# Patient Record
Sex: FEMALE | Race: OTHER | HISPANIC OR LATINO
[De-identification: names, ages, dates, MRNs, and addresses within clinical notes are randomized per-mention and may not be internally consistent; named-entity substitution may affect disease eponyms.]

---

## 2017-04-18 ENCOUNTER — APPOINTMENT (OUTPATIENT)
Dept: INTERNAL MEDICINE | Facility: CLINIC | Age: 57
End: 2017-04-18

## 2017-05-11 ENCOUNTER — APPOINTMENT (OUTPATIENT)
Dept: INTERNAL MEDICINE | Facility: CLINIC | Age: 57
End: 2017-05-11

## 2017-07-11 ENCOUNTER — APPOINTMENT (OUTPATIENT)
Dept: INTERNAL MEDICINE | Facility: CLINIC | Age: 57
End: 2017-07-11

## 2017-07-11 VITALS
SYSTOLIC BLOOD PRESSURE: 127 MMHG | HEART RATE: 68 BPM | DIASTOLIC BLOOD PRESSURE: 72 MMHG | OXYGEN SATURATION: 98 % | BODY MASS INDEX: 26.5 KG/M2 | TEMPERATURE: 98.4 F | HEIGHT: 60 IN | RESPIRATION RATE: 12 BRPM | WEIGHT: 135 LBS

## 2017-07-11 DIAGNOSIS — R73.09 OTHER ABNORMAL GLUCOSE: ICD-10-CM

## 2017-07-11 DIAGNOSIS — M79.7 FIBROMYALGIA: ICD-10-CM

## 2017-07-11 DIAGNOSIS — F41.9 ANXIETY DISORDER, UNSPECIFIED: ICD-10-CM

## 2017-07-12 LAB
HBA1C MFR BLD HPLC: 5.6 %
TSH SERPL-ACNC: 1.71 UIU/ML

## 2017-07-13 LAB
ALBUMIN SERPL ELPH-MCNC: 4.4 G/DL
ALP BLD-CCNC: 89 U/L
ALT SERPL-CCNC: 22 U/L
ANION GAP SERPL CALC-SCNC: 18 MMOL/L
AST SERPL-CCNC: 23 U/L
BILIRUB SERPL-MCNC: <0.2 MG/DL
BUN SERPL-MCNC: 23 MG/DL
CALCIUM SERPL-MCNC: 10.9 MG/DL
CHLORIDE SERPL-SCNC: 104 MMOL/L
CHOLEST SERPL-MCNC: 182 MG/DL
CHOLEST/HDLC SERPL: 3.1 RATIO
CO2 SERPL-SCNC: 21 MMOL/L
CREAT SERPL-MCNC: 0.66 MG/DL
GLUCOSE SERPL-MCNC: 99 MG/DL
HDLC SERPL-MCNC: 58 MG/DL
LDLC SERPL CALC-MCNC: 106 MG/DL
POTASSIUM SERPL-SCNC: 4.2 MMOL/L
PROT SERPL-MCNC: 7.5 G/DL
SODIUM SERPL-SCNC: 143 MMOL/L
TRIGL SERPL-MCNC: 92 MG/DL

## 2018-01-08 ENCOUNTER — APPOINTMENT (OUTPATIENT)
Dept: OBGYN | Facility: CLINIC | Age: 58
End: 2018-01-08
Payer: COMMERCIAL

## 2018-01-08 VITALS
DIASTOLIC BLOOD PRESSURE: 68 MMHG | BODY MASS INDEX: 25.32 KG/M2 | HEIGHT: 60 IN | HEART RATE: 67 BPM | SYSTOLIC BLOOD PRESSURE: 118 MMHG | WEIGHT: 129 LBS

## 2018-01-08 DIAGNOSIS — N95.1 MENOPAUSAL AND FEMALE CLIMACTERIC STATES: ICD-10-CM

## 2018-01-08 PROCEDURE — 99386 PREV VISIT NEW AGE 40-64: CPT

## 2018-01-09 LAB — HPV HIGH+LOW RISK DNA PNL CVX: NOT DETECTED

## 2018-01-11 ENCOUNTER — TRANSCRIPTION ENCOUNTER (OUTPATIENT)
Age: 58
End: 2018-01-11

## 2018-01-12 LAB — CYTOLOGY CVX/VAG DOC THIN PREP: NORMAL

## 2018-02-20 ENCOUNTER — APPOINTMENT (OUTPATIENT)
Dept: INTERNAL MEDICINE | Facility: CLINIC | Age: 58
End: 2018-02-20
Payer: COMMERCIAL

## 2018-02-20 ENCOUNTER — LABORATORY RESULT (OUTPATIENT)
Age: 58
End: 2018-02-20

## 2018-02-20 VITALS
TEMPERATURE: 99.2 F | OXYGEN SATURATION: 96 % | SYSTOLIC BLOOD PRESSURE: 99 MMHG | HEART RATE: 70 BPM | DIASTOLIC BLOOD PRESSURE: 63 MMHG | WEIGHT: 132 LBS | RESPIRATION RATE: 12 BRPM | HEIGHT: 60 IN | BODY MASS INDEX: 25.91 KG/M2

## 2018-02-20 DIAGNOSIS — E78.5 HYPERLIPIDEMIA, UNSPECIFIED: ICD-10-CM

## 2018-02-20 DIAGNOSIS — B96.89 PERSONAL HISTORY OF OTHER DISEASES OF THE RESPIRATORY SYSTEM: ICD-10-CM

## 2018-02-20 DIAGNOSIS — Z87.09 PERSONAL HISTORY OF OTHER DISEASES OF THE RESPIRATORY SYSTEM: ICD-10-CM

## 2018-02-20 DIAGNOSIS — R53.1 WEAKNESS: ICD-10-CM

## 2018-02-20 DIAGNOSIS — K21.9 GASTRO-ESOPHAGEAL REFLUX DISEASE W/OUT ESOPHAGITIS: ICD-10-CM

## 2018-02-20 DIAGNOSIS — R10.84 GENERALIZED ABDOMINAL PAIN: ICD-10-CM

## 2018-02-20 PROCEDURE — 99214 OFFICE O/P EST MOD 30 MIN: CPT

## 2018-02-21 LAB
25(OH)D3 SERPL-MCNC: 36 NG/ML
ALBUMIN SERPL ELPH-MCNC: 4.1 G/DL
ALP BLD-CCNC: 85 U/L
ALT SERPL-CCNC: 33 U/L
ANION GAP SERPL CALC-SCNC: 13 MMOL/L
APPEARANCE: CLEAR
AST SERPL-CCNC: 28 U/L
BILIRUB SERPL-MCNC: <0.2 MG/DL
BILIRUBIN URINE: NEGATIVE
BLOOD URINE: ABNORMAL
BUN SERPL-MCNC: 22 MG/DL
CALCIUM SERPL-MCNC: 10.5 MG/DL
CHLORIDE SERPL-SCNC: 102 MMOL/L
CHOLEST SERPL-MCNC: 191 MG/DL
CHOLEST/HDLC SERPL: 3.6 RATIO
CO2 SERPL-SCNC: 25 MMOL/L
COLOR: YELLOW
CREAT SERPL-MCNC: 0.82 MG/DL
GLUCOSE QUALITATIVE U: NEGATIVE MG/DL
GLUCOSE SERPL-MCNC: 90 MG/DL
HDLC SERPL-MCNC: 53 MG/DL
KETONES URINE: NEGATIVE
LDLC SERPL CALC-MCNC: 125 MG/DL
LEUKOCYTE ESTERASE URINE: ABNORMAL
NITRITE URINE: NEGATIVE
PH URINE: 7
POTASSIUM SERPL-SCNC: 4.5 MMOL/L
PROT SERPL-MCNC: 7.3 G/DL
PROTEIN URINE: NEGATIVE MG/DL
SODIUM SERPL-SCNC: 140 MMOL/L
SPECIFIC GRAVITY URINE: 1.01
TRIGL SERPL-MCNC: 67 MG/DL
TSH SERPL-ACNC: 1.41 UIU/ML
UROBILINOGEN URINE: NEGATIVE MG/DL
VIT B12 SERPL-MCNC: 368 PG/ML

## 2018-03-03 LAB
BASOPHILS # BLD AUTO: 0.02 K/UL
BASOPHILS NFR BLD AUTO: 0.3 %
EOSINOPHIL # BLD AUTO: 0.46 K/UL
EOSINOPHIL NFR BLD AUTO: 6.1 %
HCT VFR BLD CALC: 39 %
HGB BLD-MCNC: 11.9 G/DL
IMM GRANULOCYTES NFR BLD AUTO: 0.4 %
LYMPHOCYTES # BLD AUTO: 2.44 K/UL
LYMPHOCYTES NFR BLD AUTO: 32.1 %
MAN DIFF?: NORMAL
MCHC RBC-ENTMCNC: 28.1 PG
MCHC RBC-ENTMCNC: 30.5 GM/DL
MCV RBC AUTO: 92 FL
MONOCYTES # BLD AUTO: 0.66 K/UL
MONOCYTES NFR BLD AUTO: 8.7 %
NEUTROPHILS # BLD AUTO: 3.99 K/UL
NEUTROPHILS NFR BLD AUTO: 52.4 %
PLATELET # BLD AUTO: 238 K/UL
RBC # BLD: 4.24 M/UL
RBC # FLD: 13.8 %
WBC # FLD AUTO: 7.6 K/UL

## 2018-03-08 ENCOUNTER — APPOINTMENT (OUTPATIENT)
Dept: INTERNAL MEDICINE | Facility: CLINIC | Age: 58
End: 2018-03-08
Payer: COMMERCIAL

## 2018-03-08 VITALS
SYSTOLIC BLOOD PRESSURE: 119 MMHG | RESPIRATION RATE: 12 BRPM | TEMPERATURE: 98.5 F | HEIGHT: 60 IN | DIASTOLIC BLOOD PRESSURE: 73 MMHG | HEART RATE: 65 BPM | OXYGEN SATURATION: 99 %

## 2018-03-08 DIAGNOSIS — R31.29 OTHER MICROSCOPIC HEMATURIA: ICD-10-CM

## 2018-03-08 DIAGNOSIS — R53.81 OTHER MALAISE: ICD-10-CM

## 2018-03-08 DIAGNOSIS — R53.83 OTHER MALAISE: ICD-10-CM

## 2018-03-08 DIAGNOSIS — F51.01 PRIMARY INSOMNIA: ICD-10-CM

## 2018-03-08 PROCEDURE — 99214 OFFICE O/P EST MOD 30 MIN: CPT

## 2018-03-19 ENCOUNTER — OUTPATIENT (OUTPATIENT)
Dept: OUTPATIENT SERVICES | Facility: HOSPITAL | Age: 58
LOS: 1 days | End: 2018-03-19
Payer: COMMERCIAL

## 2018-03-19 DIAGNOSIS — J40 BRONCHITIS, NOT SPECIFIED AS ACUTE OR CHRONIC: ICD-10-CM

## 2018-03-19 PROCEDURE — 71046 X-RAY EXAM CHEST 2 VIEWS: CPT | Mod: 26

## 2018-03-19 PROCEDURE — 71046 X-RAY EXAM CHEST 2 VIEWS: CPT

## 2018-04-06 ENCOUNTER — OUTPATIENT (OUTPATIENT)
Dept: OUTPATIENT SERVICES | Facility: HOSPITAL | Age: 58
LOS: 1 days | End: 2018-04-06
Payer: COMMERCIAL

## 2018-04-06 DIAGNOSIS — Z82.5 FAMILY HISTORY OF ASTHMA AND OTHER CHRONIC LOWER RESPIRATORY DISEASES: ICD-10-CM

## 2018-04-06 PROCEDURE — 94010 BREATHING CAPACITY TEST: CPT

## 2018-04-06 PROCEDURE — 94729 DIFFUSING CAPACITY: CPT

## 2018-04-06 PROCEDURE — 94060 EVALUATION OF WHEEZING: CPT

## 2018-04-09 ENCOUNTER — EMERGENCY (EMERGENCY)
Facility: HOSPITAL | Age: 58
LOS: 1 days | Discharge: LEFT BEFORE DISCHARGE | End: 2018-04-09
Attending: EMERGENCY MEDICINE

## 2018-04-09 VITALS
RESPIRATION RATE: 18 BRPM | OXYGEN SATURATION: 100 % | HEART RATE: 69 BPM | WEIGHT: 130.07 LBS | SYSTOLIC BLOOD PRESSURE: 128 MMHG | HEIGHT: 62 IN | TEMPERATURE: 98 F | DIASTOLIC BLOOD PRESSURE: 74 MMHG

## 2018-04-16 ENCOUNTER — APPOINTMENT (OUTPATIENT)
Dept: UROLOGY | Facility: CLINIC | Age: 58
End: 2018-04-16

## 2019-01-11 ENCOUNTER — APPOINTMENT (OUTPATIENT)
Dept: OBGYN | Facility: CLINIC | Age: 59
End: 2019-01-11
Payer: COMMERCIAL

## 2019-01-11 VITALS
HEIGHT: 60 IN | HEART RATE: 67 BPM | DIASTOLIC BLOOD PRESSURE: 71 MMHG | WEIGHT: 132 LBS | SYSTOLIC BLOOD PRESSURE: 114 MMHG | BODY MASS INDEX: 25.91 KG/M2

## 2019-01-11 PROCEDURE — 99396 PREV VISIT EST AGE 40-64: CPT

## 2019-01-11 NOTE — PHYSICAL EXAM
[Awake] : awake [Alert] : alert [Soft] : soft [Oriented x3] : oriented to person, place, and time [Normal] : uterus [Atrophy] : atrophy [No Bleeding] : there was no active vaginal bleeding [Normal Position] : in a normal position [Uterine Adnexae] : were not tender and not enlarged [Acute Distress] : no acute distress [Mass] : no breast mass [Nipple Discharge] : no nipple discharge [Axillary LAD] : no axillary lymphadenopathy [Tender] : non tender

## 2019-01-11 NOTE — CHIEF COMPLAINT
[Annual Visit] : annual visit [FreeTextEntry1] : 58 y.o. P 3 postmenopausal female for annual  exam. pt feels well. reports some bilateral shoulder pain , and rotator cuff stiffness. pt did not take last years prescribed Prempro. pt is engaging in exercise and is taking Vit D3 . pt had mammo and Dexa 3/28/18, Birads-2 and Osteoporosis. last years pap and hpv were negative. pt still needs to follow up with Dr. Cortes for colorectal screening

## 2019-04-10 ENCOUNTER — RX RENEWAL (OUTPATIENT)
Age: 59
End: 2019-04-10

## 2019-04-10 RX ORDER — IBANDRONATE SODIUM 150 MG/1
150 TABLET ORAL
Qty: 3 | Refills: 0 | Status: ACTIVE | COMMUNITY
Start: 2019-01-11 | End: 1900-01-01

## 2020-06-09 ENCOUNTER — APPOINTMENT (OUTPATIENT)
Dept: OBGYN | Facility: CLINIC | Age: 60
End: 2020-06-09

## 2020-06-29 ENCOUNTER — APPOINTMENT (OUTPATIENT)
Dept: OBGYN | Facility: CLINIC | Age: 60
End: 2020-06-29
Payer: COMMERCIAL

## 2020-06-29 VITALS
TEMPERATURE: 98.7 F | DIASTOLIC BLOOD PRESSURE: 73 MMHG | WEIGHT: 132 LBS | HEART RATE: 70 BPM | HEIGHT: 60 IN | SYSTOLIC BLOOD PRESSURE: 119 MMHG | BODY MASS INDEX: 25.91 KG/M2

## 2020-06-29 DIAGNOSIS — Z78.0 ASYMPTOMATIC MENOPAUSAL STATE: ICD-10-CM

## 2020-06-29 DIAGNOSIS — Z01.419 ENCOUNTER FOR GYNECOLOGICAL EXAMINATION (GENERAL) (ROUTINE) W/OUT ABNORMAL FINDINGS: ICD-10-CM

## 2020-06-29 PROCEDURE — 99396 PREV VISIT EST AGE 40-64: CPT

## 2020-06-29 RX ORDER — IBANDRONATE SODIUM 150 MG/1
150 TABLET ORAL
Qty: 3 | Refills: 3 | Status: ACTIVE | COMMUNITY
Start: 2020-06-29 | End: 1900-01-01

## 2020-06-29 NOTE — CHIEF COMPLAINT
[FreeTextEntry1] : 59 y.o. P 3 postmenopausal female for annual exam. pt feels well today, but had symptomatic COVID-19 infection in March/April . pt has known Osteoporosis. pt is taking Vit. D3,  pt was prescribed Boniva  at last visit. but did not take it.  [Annual Visit] : annual visit

## 2020-06-29 NOTE — PHYSICAL EXAM
[Awake] : awake [Alert] : alert [Acute Distress] : no acute distress [Mass] : no breast mass [Nipple Discharge] : no nipple discharge [Axillary LAD] : no axillary lymphadenopathy [Oriented x3] : oriented to person, place, and time [Vulvar Atrophy] : vulvar atrophy [Tender] : non tender [Soft] : soft [Atrophy] : atrophy [Normal] : cervix [Normal Position] : in a normal position [No Bleeding] : there was no active vaginal bleeding [Uterine Adnexae] : were not tender and not enlarged

## 2020-09-06 ENCOUNTER — TRANSCRIPTION ENCOUNTER (OUTPATIENT)
Age: 60
End: 2020-09-06

## 2020-11-26 ENCOUNTER — EMERGENCY (EMERGENCY)
Facility: HOSPITAL | Age: 60
LOS: 1 days | Discharge: HOME | End: 2020-11-26
Attending: EMERGENCY MEDICINE
Payer: COMMERCIAL

## 2020-11-26 VITALS
SYSTOLIC BLOOD PRESSURE: 118 MMHG | OXYGEN SATURATION: 100 % | HEIGHT: 62 IN | TEMPERATURE: 99 F | DIASTOLIC BLOOD PRESSURE: 48 MMHG | HEART RATE: 72 BPM | RESPIRATION RATE: 16 BRPM

## 2020-11-26 VITALS
OXYGEN SATURATION: 99 % | HEART RATE: 72 BPM | DIASTOLIC BLOOD PRESSURE: 67 MMHG | RESPIRATION RATE: 18 BRPM | TEMPERATURE: 98 F | SYSTOLIC BLOOD PRESSURE: 107 MMHG

## 2020-11-26 LAB — SARS-COV-2 RNA SPEC QL NAA+PROBE: SIGNIFICANT CHANGE UP

## 2020-11-26 PROCEDURE — U0003: CPT

## 2020-11-26 PROCEDURE — 99284 EMERGENCY DEPT VISIT MOD MDM: CPT | Mod: 25

## 2020-11-26 PROCEDURE — 96374 THER/PROPH/DIAG INJ IV PUSH: CPT

## 2020-11-26 PROCEDURE — 96375 TX/PRO/DX INJ NEW DRUG ADDON: CPT

## 2020-11-26 PROCEDURE — 99284 EMERGENCY DEPT VISIT MOD MDM: CPT

## 2020-11-26 RX ORDER — METOCLOPRAMIDE HCL 10 MG
10 TABLET ORAL ONCE
Refills: 0 | Status: COMPLETED | OUTPATIENT
Start: 2020-11-26 | End: 2020-11-26

## 2020-11-26 RX ORDER — SODIUM CHLORIDE 9 MG/ML
1000 INJECTION INTRAMUSCULAR; INTRAVENOUS; SUBCUTANEOUS ONCE
Refills: 0 | Status: COMPLETED | OUTPATIENT
Start: 2020-11-26 | End: 2020-11-26

## 2020-11-26 RX ORDER — KETOROLAC TROMETHAMINE 30 MG/ML
15 SYRINGE (ML) INJECTION ONCE
Refills: 0 | Status: DISCONTINUED | OUTPATIENT
Start: 2020-11-26 | End: 2020-11-26

## 2020-11-26 RX ADMIN — Medication 104 MILLIGRAM(S): at 09:42

## 2020-11-26 RX ADMIN — Medication 15 MILLIGRAM(S): at 09:43

## 2020-11-26 RX ADMIN — SODIUM CHLORIDE 1000 MILLILITER(S): 9 INJECTION INTRAMUSCULAR; INTRAVENOUS; SUBCUTANEOUS at 09:42

## 2020-11-26 NOTE — ED ADULT NURSE NOTE - CHPI ED NUR SYMPTOMS NEG
no change in level of consciousness/no loss of consciousness/no weakness/no vomiting/no numbness/no fever/no blurred vision

## 2020-11-26 NOTE — ED PROVIDER NOTE - PATIENT PORTAL LINK FT
You can access the FollowMyHealth Patient Portal offered by [facility] by registering at the following website: [email or web address]. By joining [facility]’s FollowMyHealth portal, you will also be able to view your health information using other applications (apps) compatible with our system.

## 2020-11-26 NOTE — ED PROVIDER NOTE - PHYSICAL EXAMINATION
GEN:   comfortable, in no apparent distress, AOx3  EYES:   PERRL, extra-occular movements intact  HEENT:   airway patent, moist mucosal membranes, uvula midline  CV:   regular rate and rhythm, normal S1/S2, no murmur  RESP:   clear to auscultation bilaterally, non-labored, speaking in full sentences  ABD:   soft, non tender, no guarding  :   no cva tenderness  MSK:   no musculoskeletal tenderness, 5/5 strength, moving all extremities  SKIN:   dry, intact, no rash  NEURO:   AOx3, no focal weakness or loss of sensation, gait normal, GCS 15  PSYCH: calm, cooperative, no apparent risk to self and others

## 2020-11-26 NOTE — ED ADULT NURSE NOTE - ED STAT RN HANDOFF DETAILS
Patient discharged home as per MD order,IV access removed no redness, swelling or bleeding noted at site. All discharge instructions and F/U visits provided to patient. Patient verbalizes understanding leaving ambulatory in no acute distress.

## 2020-11-26 NOTE — ED PROVIDER NOTE - NSFOLLOWUPINSTRUCTIONS_ED_ALL_ED_FT
Please follow up with your primary care doctor. Please return to the Emergency Department for worsening signs or symptoms.    Margarito un seguimiento con maldonado médico de atención primaria. Regrese al Departamento de Emergencias si los signos o síntomas empeoran.    Dolor de robert general sin causa    General Headache Without Cause    El dolor de robert es un dolor o malestar que se siente en la ru de la robert o del pascual. Puede no tener trevor causa específica. Hay muchas causas y tipos de janet de robert. Los janet de robert más comunes son los siguientes:  •Cefaleas tensionales.      •Cefaleas migrañosas.      •Cefalea en brotes.      •Cefaleas diarias crónicas.        Siga estas indicaciones en maldonado casa:    Controle maldoando afección para detectar cualquier cambio. Informe a maldonado médico acerca de cualquier cambio. Siga estos pasos para controlar maldonado afección:      Control del dolor                   •Boulevard los medicamentos de venta tutu y los recetados solamente michaela se lo haya indicado el médico.      •Cuando sienta dolor de robert acuéstese en un cuarto oscuro y tranquilo.    •Si se lo indican, aplíquese hielo en la robert y en la ru del pascual:  •Ponga el hielo en trevor bolsa plástica.      •Coloque trevor toalla entre la piel y la bolsa.      •Coloque el hielo sinai 20 minutos, 2 a 3 veces al día.      •Si se lo indican, aplique calor en la ru afectada. Use la josue de calor que el médico le recomiende, michaela trevor compresa de calor húmedo o trevor almohadilla térmica.  •Coloque trevor toalla entre la piel y la josue de calor.      •Aplique calor sinai 20 a 30 minutos.      •Retire la josue de calor si la piel se pone de color tapia brillante. Purdin es especialmente importante si no puede sentir dolor, calor o frío. Puede correr un riesgo mayor de sufrir quemaduras.        •Mantenga las luces tenues si las luces brillantes le molestan o carloz janet de robert empeoran.      Comida y bebida     •Mantenga un horario para las comidas.    •Si blanca alcohol:•Limite la cantidad que blanca a lo siguiente:   •De 0 a 1 medida por día para las mujeres.       • De 0 a 2 medidas por día para los hombres.         •Esté atento a la cantidad de alcohol que hay en las bebidas que marlon. En los [place], trevor medida equivale a trevor botella de cerveza de 12 oz (355 ml), un vaso de vino de 5 oz (148 ml) o un vaso de trevor bebida alcohólica de shanique graduación de 1½ oz (44 ml).        •Deje de tess cafeína o disminuya la cantidad que consume.        Indicaciones generales    •Lleve un diario de los janet de robert para averiguar qué factores pueden desencadenarlos. Registre, por ejemplo, lo siguiente:  •Lo que usted come y blanca.      •El tiempo que duerme.      •Algún cambio en maldonado dieta o en los medicamentos.        •Pruebe algunas técnicas de relajación, michaela los masajes.      •Limite el estrés.      •Siéntese con la espalda recta y no tense los músculos.      • No consuma ningún producto que contenga nicotina o tabaco, michaela cigarrillos, cigarrillos electrónicos y tabaco de mascar. Si necesita ayuda para dejar de consumir, consulte al médico.      •Margarito actividad física habitualmente michaela se lo haya indicado el médico.      •Tenga un horario fijo para dormir. Duerma entre 7 y 9 horas todas las noches o la cantidad de horas que le haya recomendado el médico.      •Concurra a todas las visitas de control michaela se lo haya indicado el médico. Purdin es importante.        Comuníquese con un médico si:    •Los medicamentos no logran aliviar los síntomas.      •Tiene un dolor de robert que es diferente del dolor de robert habitual.      •Tiene náuseas o vómitos.      •Tiene fiebre.        Solicite ayuda inmediatamente si:    •El dolor se vuelve intenso rápidamente.      •El dolor empeora después de hacer actividad física moderada o intensa.      •Ha vomitado repetidas veces.      •Presenta rigidez en el pascual.      •Sufre pérdida de la visión.      •Tiene problemas para hablar.      •Siente dolor en el latoya o en el oído.      •Presenta debilidad muscular o pérdida del control muscular.      •Pierde el equilibrio o tiene problemas para caminar.      •Sufre mareos o se desmaya.      •Experimenta confusión.      •Tiene trevor convulsión.        Resumen    •El dolor de robert es un dolor o malestar que se siente en la ru de la robert o del pascual.      •Hay muchas causas y tipos de janet de robert. En algunos casos, es posible que no se encuentre la causa.      •Lleve un diario de los janet de robert para averiguar qué factores pueden desencadenarlos. Controle maldonado afección para detectar cualquier cambio. Informe a maldonado médico acerca de cualquier cambio.      •Comuníquese con un médico si tiene un dolor de robert que es diferente de lo habitual o si los síntomas no se alivian con los medicamentos.      •Solicite ayuda de inmediato si el dolor se vuelve intenso, vomita, tiene pérdida de la visión, pierde el equilibrio o tiene trevor convulsión.      Se envió un hisopo para la prueba de COVID 19 antes del shanique.    Debe usar trevor máscara siempre que esté en público o siempre que el distanciamiento social no sea posible para protegerse a sí mismo y a los demás de la propagación del COVID.    Debe aislarse y distanciarse socialmente lo mejor que pueda hasta que obtenga los resultados de la prueba COVID, y debe continuar practicando el distanciamiento social con la mayor frecuencia posible (manteniendo 6 pies de distancia de los demás tanto michaela sea posible) incluso si los resultados de la prueba son negativos.    Lávese las german con frecuencia sinai más de 30 segundos y evite tocarse la denis tanto michaela sea posible.    Actualmente no hay evidencia de trevor condición médica o quirúrgica que ponga en peligro la paresh. En richar momento, la descarga es adecuada. Sin embargo, aún existe el riesgo de que maldonado afección empeore y se vuelva grave y perjudicial para maldonado demarco. En papo de que carloz síntomas empeoren o si está preocupado y maldonado médico de atención primaria no está disponible, debe regresar a la sanchez de emergencias para trevor evaluación adicional.

## 2020-11-26 NOTE — ED PROVIDER NOTE - CARE PLAN
Principal Discharge DX:	Headache  Secondary Diagnosis:	Encounter for laboratory testing for COVID-19 virus

## 2020-11-26 NOTE — ED PROVIDER NOTE - OBJECTIVE STATEMENT
61 yo F with no PMH presents with diffuse headache since last night. took 2 tylenol without improvement in symptoms. patient did have covid in april and concern for possible re-infection. Would like to be tested. States has been under a lot of stress bc daughter has cancer. Denies blurry vision, dizziness, syncope, fevers, nausea, emesis, chest pain, shortness of breath, abdominal pain, dysuria, hematuria, diarrhea, constipation, or any other acute complaints.

## 2020-11-26 NOTE — ED PROVIDER NOTE - NS ED ROS FT
ROS  GENERAL: no fevers, no chills  EYES: no visual changes, no blurry vision    ENT: no epistaxis,  no throat pain  CHEST: no pain with breathing,  no hemoptysis   CARDIAC: no chest pain, no upper back pain   GI: no abdominal pain, no hematemesis, no bright red blood per rectum   : no dysuria, no hematuria   MSK: no arm pain, no leg pain, no back pain   SKIN: no rash   NEURO: + headache, no neck pain   HEME: no easy bruising, no easy bleeding

## 2020-11-26 NOTE — ED PROVIDER NOTE - NSFOLLOWUPCLINICS_GEN_ALL_ED_FT
[facility]  [facility]  [address] - [address] [place] [address]  Phone: [phone]  Fax: [phone]  Follow Up Time:

## 2020-11-26 NOTE — ED PROVIDER NOTE - CLINICAL SUMMARY MEDICAL DECISION MAKING FREE TEXT BOX
59 yo F with diffuse headache. Hx of covid and concern for possible re-infection. Neurologically intact without focal deficits. patient given analgesia for headache and improved. Nontoxic and medically stable for discharge. Return precautions provided and patient understands to return to the ED for worsening signs and symptoms. Instructed to follow up with primary care physician and agreeable. Patient's questions answered.

## 2020-11-26 NOTE — ED ADULT NURSE NOTE - OBJECTIVE STATEMENT
patient present to E with headache starting last night was severe throbbing to generalizes head took 2 tylenol at present time denies pain but was worried she had covid in april and just wanted to make sure cause her daughter is sickl

## 2020-12-16 PROBLEM — Z87.09 HISTORY OF ACUTE BACTERIAL SINUSITIS: Status: RESOLVED | Noted: 2018-02-20 | Resolved: 2020-12-16

## 2021-01-31 ENCOUNTER — TRANSCRIPTION ENCOUNTER (OUTPATIENT)
Age: 61
End: 2021-01-31

## 2021-03-22 ENCOUNTER — APPOINTMENT (OUTPATIENT)
Dept: OBGYN | Facility: CLINIC | Age: 61
End: 2021-03-22
Payer: COMMERCIAL

## 2021-03-22 VITALS
WEIGHT: 120.19 LBS | HEART RATE: 63 BPM | SYSTOLIC BLOOD PRESSURE: 103 MMHG | DIASTOLIC BLOOD PRESSURE: 61 MMHG | TEMPERATURE: 97.4 F | BODY MASS INDEX: 23.6 KG/M2 | HEIGHT: 60 IN

## 2021-03-22 DIAGNOSIS — M81.0 AGE-RELATED OSTEOPOROSIS W/OUT CURRENT PATHOLOGICAL FRACTURE: ICD-10-CM

## 2021-03-22 PROCEDURE — 99072 ADDL SUPL MATRL&STAF TM PHE: CPT

## 2021-03-22 PROCEDURE — 99213 OFFICE O/P EST LOW 20 MIN: CPT

## 2021-03-22 NOTE — HISTORY OF PRESENT ILLNESS
[FreeTextEntry1] : pt presents  for follow up, for discussion on Osteoporosis. pt was taking Boniva, but wishes to try Prolia. \par pt repors having colonoscopy in Jan. 2021, - ' everything was normal , no polyps, no nothing'.   f/u is 3 years.

## 2021-03-22 NOTE — DISCUSSION/SUMMARY
[FreeTextEntry1] : A - Osteoporosis\par \par P Prolia q 6 mos. script sent to pharmacy\par     pt to return end of June for annual exam. \par      colonoscopy next due in 2024. \par     next dexa due in 2022\par      pap due at time of annual in June 2021

## 2021-03-23 ENCOUNTER — TRANSCRIPTION ENCOUNTER (OUTPATIENT)
Age: 61
End: 2021-03-23

## 2021-03-24 ENCOUNTER — NON-APPOINTMENT (OUTPATIENT)
Age: 61
End: 2021-03-24

## 2021-04-09 ENCOUNTER — NON-APPOINTMENT (OUTPATIENT)
Age: 61
End: 2021-04-09

## 2021-04-30 ENCOUNTER — APPOINTMENT (OUTPATIENT)
Dept: OBGYN | Facility: CLINIC | Age: 61
End: 2021-04-30

## 2021-05-04 ENCOUNTER — APPOINTMENT (OUTPATIENT)
Dept: OBGYN | Facility: CLINIC | Age: 61
End: 2021-05-04
Payer: COMMERCIAL

## 2021-05-04 PROCEDURE — 96372 THER/PROPH/DIAG INJ SC/IM: CPT

## 2021-05-04 PROCEDURE — 99072 ADDL SUPL MATRL&STAF TM PHE: CPT

## 2021-05-04 RX ORDER — DENOSUMAB 60 MG/ML
60 INJECTION SUBCUTANEOUS
Qty: 1 | Refills: 0 | Status: COMPLETED | OUTPATIENT
Start: 2021-05-04

## 2021-05-04 RX ADMIN — DENOSUMAB 0 MG/ML: 60 INJECTION SUBCUTANEOUS at 00:00

## 2021-07-20 ENCOUNTER — APPOINTMENT (OUTPATIENT)
Dept: OBGYN | Facility: CLINIC | Age: 61
End: 2021-07-20
Payer: COMMERCIAL

## 2021-07-20 VITALS
SYSTOLIC BLOOD PRESSURE: 98 MMHG | BODY MASS INDEX: 23.95 KG/M2 | DIASTOLIC BLOOD PRESSURE: 61 MMHG | HEIGHT: 60 IN | HEART RATE: 56 BPM | WEIGHT: 122 LBS

## 2021-07-20 DIAGNOSIS — N95.2 POSTMENOPAUSAL ATROPHIC VAGINITIS: ICD-10-CM

## 2021-07-20 DIAGNOSIS — M81.0 AGE-RELATED OSTEOPOROSIS W/OUT CURRENT PATHOLOGICAL FRACTURE: ICD-10-CM

## 2021-07-20 PROCEDURE — 99072 ADDL SUPL MATRL&STAF TM PHE: CPT

## 2021-07-20 PROCEDURE — 99396 PREV VISIT EST AGE 40-64: CPT

## 2021-07-20 NOTE — PHYSICAL EXAM
[Appropriately responsive] : appropriately responsive [Alert] : alert [No Acute Distress] : no acute distress [No Lymphadenopathy] : no lymphadenopathy [Regular Rate Rhythm] : regular rate rhythm [No Murmurs] : no murmurs [Clear to Auscultation B/L] : clear to auscultation bilaterally [Soft] : soft [Non-tender] : non-tender [Non-distended] : non-distended [No Lesions] : no lesions [No HSM] : No HSM [No Mass] : no mass [Oriented x3] : oriented x3 [Examination Of The Breasts] : a normal appearance [No Masses] : no breast masses were palpable [Labia Majora] : normal [Labia Minora] : normal [Atrophy] : atrophy [Normal] : normal [Uterine Adnexae] : normal

## 2021-07-20 NOTE — DISCUSSION/SUMMARY
[FreeTextEntry1] : A - WWV\par      atrophic vaginitis\par      menopause\par     Osteoporosis\par \par P- - pap done\par      f/u 1 year\par     pt to continue Vit D and Ca2+\par     pt to continue with Prolia semi-annually\par      pt is walking daily, \par      nutritional counseling provided\par    referral for mammo given for MSR\par    next colonoscopy 2023 \par     next Dexa in 2022.

## 2021-07-20 NOTE — HISTORY OF PRESENT ILLNESS
[FreeTextEntry1] : 60 y.o. P 3 postmenopausal female, for annual exam. pt has Osteoporosis and is now receiving Prolia semi-annually. pt has family hx of stomach ca - mother, and colorectal cancer - daughter, pt  went for colonoscopy in November 2020 and goes every 3 years. next due in 2023.

## 2021-07-21 LAB — HPV HIGH+LOW RISK DNA PNL CVX: NOT DETECTED

## 2021-07-27 LAB — CYTOLOGY CVX/VAG DOC THIN PREP: ABNORMAL

## 2021-08-14 ENCOUNTER — TRANSCRIPTION ENCOUNTER (OUTPATIENT)
Age: 61
End: 2021-08-14

## 2021-09-17 ENCOUNTER — NON-APPOINTMENT (OUTPATIENT)
Age: 61
End: 2021-09-17

## 2021-09-20 ENCOUNTER — NON-APPOINTMENT (OUTPATIENT)
Age: 61
End: 2021-09-20

## 2021-09-21 ENCOUNTER — NON-APPOINTMENT (OUTPATIENT)
Age: 61
End: 2021-09-21

## 2021-10-04 ENCOUNTER — NON-APPOINTMENT (OUTPATIENT)
Age: 61
End: 2021-10-04

## 2021-10-07 ENCOUNTER — NON-APPOINTMENT (OUTPATIENT)
Age: 61
End: 2021-10-07

## 2021-11-01 ENCOUNTER — NON-APPOINTMENT (OUTPATIENT)
Age: 61
End: 2021-11-01

## 2021-11-04 ENCOUNTER — APPOINTMENT (OUTPATIENT)
Dept: OBGYN | Facility: CLINIC | Age: 61
End: 2021-11-04
Payer: COMMERCIAL

## 2021-11-04 PROCEDURE — 96372 THER/PROPH/DIAG INJ SC/IM: CPT

## 2021-11-04 RX ORDER — DENOSUMAB 60 MG/ML
60 INJECTION SUBCUTANEOUS
Qty: 1 | Refills: 0 | Status: COMPLETED | OUTPATIENT
Start: 2021-11-04

## 2022-02-04 ENCOUNTER — APPOINTMENT (OUTPATIENT)
Dept: GASTROENTEROLOGY | Facility: CLINIC | Age: 62
End: 2022-02-04
Payer: COMMERCIAL

## 2022-02-04 VITALS — BODY MASS INDEX: 22.32 KG/M2 | HEIGHT: 63 IN | WEIGHT: 126 LBS

## 2022-02-04 DIAGNOSIS — R10.31 RIGHT LOWER QUADRANT PAIN: ICD-10-CM

## 2022-02-04 DIAGNOSIS — Z80.0 ENCOUNTER FOR SCREENING FOR MALIGNANT NEOPLASM OF COLON: ICD-10-CM

## 2022-02-04 DIAGNOSIS — Z12.11 ENCOUNTER FOR SCREENING FOR MALIGNANT NEOPLASM OF COLON: ICD-10-CM

## 2022-02-04 PROCEDURE — 99204 OFFICE O/P NEW MOD 45 MIN: CPT

## 2022-02-04 NOTE — HISTORY OF PRESENT ILLNESS
[FreeTextEntry1] : She  is a 61-year-old female with a history of right lower quadrant abdominal pain which occurred 3 weeks ago..  She is feeling well now and has no complaints.  She has a family history of colon cancer specifically her daughter and  her mother was recently diagnosed with  " stomach " cancer.  She had a colonoscopy and endoscopy in [place] last year,  which were normal.  She denies constipation, diarrhea  or rectal bleeding. She  denies weight loss.  She denies nausea or  vomiting.\par \par Translation was through line language line

## 2022-02-04 NOTE — ASSESSMENT
[FreeTextEntry1] : Right lower quadrant abdominal pain which resolved\par History of colon cancer\par Recent colonoscopy last year while in [place] was normal \par \par I ordered a CT scan with IV contrast of the abdomen and pelvis.\par If this is negative, then  I will discuss with her about repeating a colonoscopy, since I did not do her colonoscopy last year and  need to make sure that she is free of colon cancer\par \par Office f/u in 2 weeks

## 2022-02-04 NOTE — CONSULT LETTER
[Dear  ___] : Dear  [unfilled], [Consult Letter:] : I had the pleasure of evaluating your patient, [unfilled]. [( Thank you for referring [unfilled] for consultation for _____ )] : Thank you for referring [unfilled] for consultation for [unfilled] [Please see my note below.] : Please see my note below. [Consult Closing:] : Thank you very much for allowing me to participate in the care of this patient.  If you have any questions, please do not hesitate to contact me. [Sincerely,] : Sincerely, [FreeTextEntry3] : Don\par \par Vernon Delgadillo MD\par \par Gastroenterology\par [facility]\par [facility]\par \par

## 2022-03-03 ENCOUNTER — APPOINTMENT (OUTPATIENT)
Dept: GASTROENTEROLOGY | Facility: CLINIC | Age: 62
End: 2022-03-03
Payer: COMMERCIAL

## 2022-03-03 VITALS
SYSTOLIC BLOOD PRESSURE: 120 MMHG | DIASTOLIC BLOOD PRESSURE: 80 MMHG | WEIGHT: 123 LBS | HEIGHT: 63 IN | BODY MASS INDEX: 21.79 KG/M2

## 2022-03-03 DIAGNOSIS — R10.12 LEFT UPPER QUADRANT PAIN: ICD-10-CM

## 2022-03-03 DIAGNOSIS — R10.13 EPIGASTRIC PAIN: ICD-10-CM

## 2022-03-03 DIAGNOSIS — R14.2 ERUCTATION: ICD-10-CM

## 2022-03-03 PROCEDURE — 99214 OFFICE O/P EST MOD 30 MIN: CPT

## 2022-03-03 NOTE — HISTORY OF PRESENT ILLNESS
[FreeTextEntry1] : She complains of intermittent crampy  LUQ and midepigastric abdominal pain. She also admits to belching.  She has a family history of gastric cancer.  She had an endoscopy in [facility] last year which was normal. She had an endoscopy by me  in  2018 which revealed a gastric erosion. She also has a past history  of Helicobacter pylori.  She denies NSAID use or weight loss.\par \par Translation through Jennifer

## 2022-03-03 NOTE — ASSESSMENT
[FreeTextEntry1] : GORDY BAIN was advised to undergo endoscopy to which she agreed. The procedure will be performed in [facility] with the assistance of an anesthesiologist. She was given a booklet distributed by the American Society of G[employer] explaining the procedure in detail and she understood the risks of the procedure not limited to infection, bleeding, perforation or non- diagnosis of gastric or esophageal cancer.  She was advised that she could not drive home, if she chooses to receive sedation. Further diagnostic and treatment recommendations will be based upon the procedure and any biopsies, if they are taken. Thank you for allowing me to participate in this patients health care.\par

## 2022-03-03 NOTE — CONSULT LETTER
[Dear  ___] : Dear  [unfilled], [Consult Letter:] : I had the pleasure of evaluating your patient, [unfilled]. [( Thank you for referring [unfilled] for consultation for _____ )] : Thank you for referring [unfilled] for consultation for [unfilled] [Please see my note below.] : Please see my note below. [Consult Closing:] : Thank you very much for allowing me to participate in the care of this patient.  If you have any questions, please do not hesitate to contact me. [Sincerely,] : Sincerely, [FreeTextEntry3] : Don\par \par Vernon Delgadillo MD\par \par Gastroenterology\par [facility]\par [facility]\par \par

## 2022-03-16 ENCOUNTER — RX RENEWAL (OUTPATIENT)
Age: 62
End: 2022-03-16

## 2022-03-20 ENCOUNTER — RESULT REVIEW (OUTPATIENT)
Age: 62
End: 2022-03-20

## 2022-03-21 ENCOUNTER — APPOINTMENT (OUTPATIENT)
Dept: GASTROENTEROLOGY | Facility: AMBULATORY SURGERY CENTER | Age: 62
End: 2022-03-21
Payer: COMMERCIAL

## 2022-03-21 PROCEDURE — 43239 EGD BIOPSY SINGLE/MULTIPLE: CPT

## 2022-03-28 ENCOUNTER — RX RENEWAL (OUTPATIENT)
Age: 62
End: 2022-03-28

## 2022-05-04 ENCOUNTER — APPOINTMENT (OUTPATIENT)
Dept: OBGYN | Facility: CLINIC | Age: 62
End: 2022-05-04
Payer: COMMERCIAL

## 2022-05-04 PROCEDURE — 96372 THER/PROPH/DIAG INJ SC/IM: CPT

## 2022-05-04 RX ORDER — DENOSUMAB 60 MG/ML
60 INJECTION SUBCUTANEOUS
Qty: 1 | Refills: 0 | Status: COMPLETED | COMMUNITY
Start: 2021-03-23 | End: 2022-05-04

## 2022-05-04 RX ORDER — DENOSUMAB 60 MG/ML
60 INJECTION SUBCUTANEOUS
Qty: 1 | Refills: 1 | Status: COMPLETED | COMMUNITY
Start: 2021-03-22 | End: 2022-05-04

## 2022-05-25 ENCOUNTER — APPOINTMENT (OUTPATIENT)
Dept: OBGYN | Facility: CLINIC | Age: 62
End: 2022-05-25

## 2022-06-01 DIAGNOSIS — N63.20 UNSPECIFIED LUMP IN THE LEFT BREAST, UNSPECIFIED QUADRANT: ICD-10-CM

## 2022-06-01 DIAGNOSIS — R92.2 INCONCLUSIVE MAMMOGRAM: ICD-10-CM

## 2022-06-03 ENCOUNTER — APPOINTMENT (OUTPATIENT)
Dept: OBGYN | Facility: CLINIC | Age: 62
End: 2022-06-03
Payer: COMMERCIAL

## 2022-06-03 VITALS
DIASTOLIC BLOOD PRESSURE: 58 MMHG | BODY MASS INDEX: 23.39 KG/M2 | WEIGHT: 132 LBS | HEIGHT: 63 IN | SYSTOLIC BLOOD PRESSURE: 98 MMHG | HEART RATE: 67 BPM

## 2022-06-03 DIAGNOSIS — N64.4 MASTODYNIA: ICD-10-CM

## 2022-06-03 PROCEDURE — 99213 OFFICE O/P EST LOW 20 MIN: CPT

## 2022-06-03 NOTE — PHYSICAL EXAM
[Examination Of The Breasts] : a normal appearance [Normal] : normal [Tenderness Of The Right Breast] : tenderness [No Masses] : no breast masses were palpable [Tenderness Of The Left Breast] : tenderness

## 2022-06-03 NOTE — DISCUSSION/SUMMARY
[FreeTextEntry1] : A - bilateral mastalgia with point tenderness in UOQ with associated nodularity\par \par P- Bilateral Diagnostic mammography and ultrasound\par     will follow up when results are in \par      pt is due for Annual exam in August. 2022.

## 2022-06-03 NOTE — HISTORY OF PRESENT ILLNESS
[FreeTextEntry1] : pt presents for follow up, of pain bilaterally in UOQ of both breasts , with associated point tenderness and nodularity, for the past 2 months.

## 2022-11-10 ENCOUNTER — APPOINTMENT (OUTPATIENT)
Dept: OBGYN | Facility: CLINIC | Age: 62
End: 2022-11-10

## 2022-11-10 PROCEDURE — 96372 THER/PROPH/DIAG INJ SC/IM: CPT

## 2022-12-14 ENCOUNTER — NON-APPOINTMENT (OUTPATIENT)
Age: 62
End: 2022-12-14

## 2023-03-07 ENCOUNTER — RX RENEWAL (OUTPATIENT)
Age: 63
End: 2023-03-07

## 2023-03-21 ENCOUNTER — NON-APPOINTMENT (OUTPATIENT)
Age: 63
End: 2023-03-21

## 2023-05-12 ENCOUNTER — APPOINTMENT (OUTPATIENT)
Dept: OBGYN | Facility: CLINIC | Age: 63
End: 2023-05-12
Payer: COMMERCIAL

## 2023-05-12 PROCEDURE — 96372 THER/PROPH/DIAG INJ SC/IM: CPT

## 2023-05-12 RX ORDER — DENOSUMAB 60 MG/ML
60 INJECTION SUBCUTANEOUS
Qty: 1 | Refills: 0 | Status: COMPLETED | OUTPATIENT
Start: 2023-05-12

## 2023-05-12 RX ORDER — DENOSUMAB 60 MG/ML
60 INJECTION SUBCUTANEOUS
Qty: 1 | Refills: 0 | Status: ACTIVE | COMMUNITY
Start: 2020-09-15 | End: 1900-01-01

## 2023-05-12 RX ADMIN — DENOSUMAB 0 MG/ML: 60 INJECTION SUBCUTANEOUS at 00:00

## 2023-06-28 ENCOUNTER — NON-APPOINTMENT (OUTPATIENT)
Age: 63
End: 2023-06-28

## 2023-07-18 ENCOUNTER — APPOINTMENT (OUTPATIENT)
Dept: OBGYN | Facility: CLINIC | Age: 63
End: 2023-07-18
Payer: COMMERCIAL

## 2023-07-18 VITALS
HEART RATE: 58 BPM | BODY MASS INDEX: 23.25 KG/M2 | SYSTOLIC BLOOD PRESSURE: 117 MMHG | DIASTOLIC BLOOD PRESSURE: 68 MMHG | HEIGHT: 63 IN | WEIGHT: 131.19 LBS

## 2023-07-18 DIAGNOSIS — I83.90 ASYMPTOMATIC VARICOSE VEINS OF UNSPECIFIED LOWER EXTREMITY: ICD-10-CM

## 2023-07-18 DIAGNOSIS — Z78.0 ASYMPTOMATIC MENOPAUSAL STATE: ICD-10-CM

## 2023-07-18 DIAGNOSIS — Z01.411 ENCOUNTER FOR GYNECOLOGICAL EXAMINATION (GENERAL) (ROUTINE) WITH ABNORMAL FINDINGS: ICD-10-CM

## 2023-07-18 DIAGNOSIS — M81.0 AGE-RELATED OSTEOPOROSIS W/OUT CURRENT PATHOLOGICAL FRACTURE: ICD-10-CM

## 2023-07-18 PROCEDURE — 99396 PREV VISIT EST AGE 40-64: CPT

## 2023-07-18 RX ORDER — DICYCLOMINE HYDROCHLORIDE 20 MG/1
20 TABLET ORAL
Qty: 90 | Refills: 3 | Status: COMPLETED | COMMUNITY
Start: 2022-03-21 | End: 2023-07-18

## 2023-07-18 RX ORDER — ZOLPIDEM TARTRATE 5 MG/1
5 TABLET ORAL
Qty: 15 | Refills: 0 | Status: COMPLETED | COMMUNITY
Start: 2018-03-08 | End: 2023-07-18

## 2023-07-18 RX ORDER — LEVOFLOXACIN 500 MG/1
500 TABLET, FILM COATED ORAL
Qty: 10 | Refills: 0 | Status: COMPLETED | COMMUNITY
Start: 2018-02-20 | End: 2023-07-18

## 2023-07-18 RX ORDER — CONJUGATED ESTROGENS AND MEDROXYPROGESTERONE ACETATE .3; 1.5 MG/1; MG/1
0.3-1.5 TABLET, SUGAR COATED ORAL DAILY
Qty: 84 | Refills: 3 | Status: COMPLETED | COMMUNITY
Start: 2018-01-08 | End: 2023-07-18

## 2023-07-18 NOTE — DISCUSSION/SUMMARY
[FreeTextEntry1] : A - WWV\par      menopause\par       atrophic vaginitis\par      venous varicosities ( superficial)\par      Osteoporosis\par \par P- f/u 1 year\par      pap next due 2024\par     referral for colorectal acreening given for this year ( pt has appt. in Sept. )\par      next Dexa due Dec. 2024\par    referral for mammo for this year given\par     nutritional counseling provided\par     exercise encouraged\par    referral to Raj Caruso for consultation for venous varicositites. \par    f/u here in November for next Prolia injection.

## 2023-07-18 NOTE — HISTORY OF PRESENT ILLNESS
[FreeTextEntry1] : 62 y.o. P 2  postmenopausal female presents for annual exam. pt feels well, pt has Osteoporosis and receives Prolia injections semi-annually here at our office, , PMH - negative ,  PSHx - negative, FH - Stomach ca - mother,  colon ca - daughter , SH negative, GYN hx - negative,  OB hx -   x 1, C/S x 1. \par pt is interested in management of superficial varicosities in lower extremities

## 2023-08-01 ENCOUNTER — APPOINTMENT (OUTPATIENT)
Dept: GASTROENTEROLOGY | Facility: CLINIC | Age: 63
End: 2023-08-01
Payer: COMMERCIAL

## 2023-08-01 VITALS
HEART RATE: 60 BPM | DIASTOLIC BLOOD PRESSURE: 65 MMHG | OXYGEN SATURATION: 100 % | WEIGHT: 128 LBS | SYSTOLIC BLOOD PRESSURE: 101 MMHG | TEMPERATURE: 97.5 F | BODY MASS INDEX: 22.67 KG/M2

## 2023-08-01 DIAGNOSIS — Z12.11 ENCOUNTER FOR SCREENING FOR MALIGNANT NEOPLASM OF COLON: ICD-10-CM

## 2023-08-01 DIAGNOSIS — K44.9 DIAPHRAGMATIC HERNIA W/OUT OBSTRUCTION OR GANGRENE: ICD-10-CM

## 2023-08-01 PROCEDURE — 99213 OFFICE O/P EST LOW 20 MIN: CPT

## 2023-08-01 RX ORDER — POLYETHYLENE GLYCOL-3350 AND ELECTROLYTES WITH FLAVOR PACK 240; 5.84; 2.98; 6.72; 22.72 G/278.26G; G/278.26G; G/278.26G; G/278.26G; G/278.26G
240 POWDER, FOR SOLUTION ORAL
Qty: 1 | Refills: 0 | Status: ACTIVE | COMMUNITY
Start: 2023-08-01 | End: 1900-01-01

## 2023-08-17 NOTE — HISTORY OF PRESENT ILLNESS
[FreeTextEntry1] : This is a 63 year old female here for a colon cancer screening. PMH of osteoporosis, endometriosis, . Mother with gastric cancer at 86 and daughter colon cancer at 36. Denies a family history of high risk polyps, Inflammatory and autoimmune disease. Patient denies any difficulty swallowing or reflux. No N&V or abdominal pain. No blood or dark tarry stools. Normal caliber. Weight stable. EGD from  with a small hiatal hernia. Colonoscopy from  with polyps.  Smoke/Etoh: none

## 2023-08-17 NOTE — ASSESSMENT
[FreeTextEntry1] : This is a 63 year old female here for a colon cancer screening.  My plan  Colonoscopy gavilyte  Risks, benefits, and alternatives of colonoscopy discussed at length with patient including but not limited to bleeding, perforation, anesthesia related complication, aspiration, etc. Patient expressed understanding.  Colonoscopy for evaluation of polyps, tumors, nodules with +/- biopsy and or cauterization w/ and or w/o clipping.   Total time spent to complete patient's bedside assessment, physical examination, review medical chart including labs & imaging, discuss medical plan of care with patient was more than 30 minutes.

## 2023-08-30 ENCOUNTER — APPOINTMENT (OUTPATIENT)
Dept: GASTROENTEROLOGY | Facility: HOSPITAL | Age: 63
End: 2023-08-30

## 2023-09-27 ENCOUNTER — APPOINTMENT (OUTPATIENT)
Dept: GASTROENTEROLOGY | Facility: CLINIC | Age: 63
End: 2023-09-27

## 2023-09-27 VITALS
OXYGEN SATURATION: 98 % | TEMPERATURE: 97.1 F | HEART RATE: 67 BPM | WEIGHT: 132 LBS | SYSTOLIC BLOOD PRESSURE: 102 MMHG | BODY MASS INDEX: 23.39 KG/M2 | DIASTOLIC BLOOD PRESSURE: 63 MMHG | HEIGHT: 63 IN

## 2023-10-11 ENCOUNTER — TRANSCRIPTION ENCOUNTER (OUTPATIENT)
Age: 63
End: 2023-10-11

## 2023-10-11 ENCOUNTER — RESULT REVIEW (OUTPATIENT)
Age: 63
End: 2023-10-11

## 2023-10-11 ENCOUNTER — OUTPATIENT (OUTPATIENT)
Dept: OUTPATIENT SERVICES | Facility: HOSPITAL | Age: 63
LOS: 1 days | End: 2023-10-11
Payer: COMMERCIAL

## 2023-10-11 ENCOUNTER — APPOINTMENT (OUTPATIENT)
Dept: GASTROENTEROLOGY | Facility: HOSPITAL | Age: 63
End: 2023-10-11

## 2023-10-11 VITALS
HEART RATE: 72 BPM | SYSTOLIC BLOOD PRESSURE: 99 MMHG | RESPIRATION RATE: 15 BRPM | OXYGEN SATURATION: 100 % | DIASTOLIC BLOOD PRESSURE: 56 MMHG

## 2023-10-11 VITALS
TEMPERATURE: 98 F | HEIGHT: 63 IN | WEIGHT: 130.95 LBS | RESPIRATION RATE: 18 BRPM | SYSTOLIC BLOOD PRESSURE: 144 MMHG | HEART RATE: 89 BPM | DIASTOLIC BLOOD PRESSURE: 65 MMHG | OXYGEN SATURATION: 100 %

## 2023-10-11 DIAGNOSIS — Z98.891 HISTORY OF UTERINE SCAR FROM PREVIOUS SURGERY: Chronic | ICD-10-CM

## 2023-10-11 DIAGNOSIS — Z12.11 ENCOUNTER FOR SCREENING FOR MALIGNANT NEOPLASM OF COLON: ICD-10-CM

## 2023-10-11 DIAGNOSIS — Z98.890 OTHER SPECIFIED POSTPROCEDURAL STATES: Chronic | ICD-10-CM

## 2023-10-11 PROCEDURE — 45385 COLONOSCOPY W/LESION REMOVAL: CPT | Mod: PT

## 2023-10-11 PROCEDURE — 88305 TISSUE EXAM BY PATHOLOGIST: CPT | Mod: 26

## 2023-10-11 PROCEDURE — 45385 COLONOSCOPY W/LESION REMOVAL: CPT

## 2023-10-11 PROCEDURE — 88305 TISSUE EXAM BY PATHOLOGIST: CPT

## 2023-10-13 LAB — SURGICAL PATHOLOGY STUDY: SIGNIFICANT CHANGE UP

## 2023-10-30 PROBLEM — E78.00 PURE HYPERCHOLESTEROLEMIA, UNSPECIFIED: Chronic | Status: ACTIVE | Noted: 2023-10-11

## 2023-10-30 PROBLEM — E11.9 TYPE 2 DIABETES MELLITUS WITHOUT COMPLICATIONS: Chronic | Status: ACTIVE | Noted: 2023-10-11

## 2023-11-06 ENCOUNTER — APPOINTMENT (OUTPATIENT)
Dept: OBGYN | Facility: CLINIC | Age: 63
End: 2023-11-06

## 2023-11-15 RX ORDER — OMEPRAZOLE 20 MG/1
20 CAPSULE, DELAYED RELEASE ORAL
Qty: 30 | Refills: 5 | Status: ACTIVE | COMMUNITY
Start: 2023-08-01 | End: 1900-01-01

## 2023-11-27 ENCOUNTER — APPOINTMENT (OUTPATIENT)
Dept: OBGYN | Facility: CLINIC | Age: 63
End: 2023-11-27
Payer: COMMERCIAL

## 2023-11-27 PROCEDURE — 96401 CHEMO ANTI-NEOPL SQ/IM: CPT

## 2023-11-27 RX ADMIN — DENOSUMAB 60 MG/ML: 60 INJECTION SUBCUTANEOUS at 00:00

## 2023-11-29 RX ORDER — DENOSUMAB 60 MG/ML
60 INJECTION SUBCUTANEOUS
Qty: 1 | Refills: 0 | Status: COMPLETED | OUTPATIENT
Start: 2023-11-27

## 2023-12-14 ENCOUNTER — APPOINTMENT (OUTPATIENT)
Dept: GASTROENTEROLOGY | Facility: CLINIC | Age: 63
End: 2023-12-14
Payer: COMMERCIAL

## 2023-12-14 VITALS
TEMPERATURE: 98.7 F | BODY MASS INDEX: 23.57 KG/M2 | HEIGHT: 63 IN | SYSTOLIC BLOOD PRESSURE: 120 MMHG | WEIGHT: 133 LBS | DIASTOLIC BLOOD PRESSURE: 66 MMHG | HEART RATE: 80 BPM | RESPIRATION RATE: 12 BRPM | OXYGEN SATURATION: 98 %

## 2023-12-14 DIAGNOSIS — R43.8 OTHER DISTURBANCES OF SMELL AND TASTE: ICD-10-CM

## 2023-12-14 DIAGNOSIS — K21.9 GASTRO-ESOPHAGEAL REFLUX DISEASE W/OUT ESOPHAGITIS: ICD-10-CM

## 2023-12-14 DIAGNOSIS — R10.11 RIGHT UPPER QUADRANT PAIN: ICD-10-CM

## 2023-12-14 PROCEDURE — 36415 COLL VENOUS BLD VENIPUNCTURE: CPT

## 2023-12-14 PROCEDURE — 99214 OFFICE O/P EST MOD 30 MIN: CPT | Mod: 25

## 2023-12-14 NOTE — ASSESSMENT
[FreeTextEntry1] : Labs are drawn Ultrasound was ordered Start pantoprazole 40 mg daily 1 hour before breakfast  Office follow-up when she comes back to [place]

## 2023-12-14 NOTE — CONSULT LETTER
[Dear  ___] : Dear  [unfilled], [Consult Letter:] : I had the pleasure of evaluating your patient, [unfilled]. [( Thank you for referring [unfilled] for consultation for _____ )] : Thank you for referring [unfilled] for consultation for [unfilled] [Please see my note below.] : Please see my note below. [Consult Closing:] : Thank you very much for allowing me to participate in the care of this patient.  If you have any questions, please do not hesitate to contact me. [Sincerely,] : Sincerely, [FreeTextEntry3] : Vernon Delgadillo MD  Gastroenterology [facility]

## 2023-12-14 NOTE — HISTORY OF PRESENT ILLNESS
[FreeTextEntry1] : She complains of new onset of an acid taste in her mouth feeling like a bitter taste.  She also admits to right upper quadrant abdominal pain a few weeks ago which has resolved.  She denies nausea, vomiting,  NSAID use or weight loss.  Last endoscopy was last year which was normal.    aMrgot was in the room and acted as

## 2023-12-15 LAB
AFP-TM SERPL-MCNC: 2.2 NG/ML
ALBUMIN SERPL ELPH-MCNC: 4.4 G/DL
ALP BLD-CCNC: 62 U/L
ALT SERPL-CCNC: 20 U/L
ANION GAP SERPL CALC-SCNC: 12 MMOL/L
AST SERPL-CCNC: 21 U/L
BASOPHILS # BLD AUTO: 0.04 K/UL
BASOPHILS NFR BLD AUTO: 0.6 %
BILIRUB SERPL-MCNC: 0.2 MG/DL
BUN SERPL-MCNC: 14 MG/DL
CALCIUM SERPL-MCNC: 10.7 MG/DL
CHLORIDE SERPL-SCNC: 103 MMOL/L
CO2 SERPL-SCNC: 26 MMOL/L
CREAT SERPL-MCNC: 0.63 MG/DL
EGFR: 100 ML/MIN/1.73M2
EOSINOPHIL # BLD AUTO: 0.47 K/UL
EOSINOPHIL NFR BLD AUTO: 6.7 %
GLUCOSE SERPL-MCNC: 125 MG/DL
HCT VFR BLD CALC: 39.8 %
HGB BLD-MCNC: 12.4 G/DL
IMM GRANULOCYTES NFR BLD AUTO: 0.1 %
LYMPHOCYTES # BLD AUTO: 2.27 K/UL
LYMPHOCYTES NFR BLD AUTO: 32.4 %
MAN DIFF?: NORMAL
MCHC RBC-ENTMCNC: 28.5 PG
MCHC RBC-ENTMCNC: 31.2 GM/DL
MCV RBC AUTO: 91.5 FL
MONOCYTES # BLD AUTO: 0.51 K/UL
MONOCYTES NFR BLD AUTO: 7.3 %
NEUTROPHILS # BLD AUTO: 3.7 K/UL
NEUTROPHILS NFR BLD AUTO: 52.9 %
PLATELET # BLD AUTO: 261 K/UL
POTASSIUM SERPL-SCNC: 4.1 MMOL/L
PROT SERPL-MCNC: 7.2 G/DL
RBC # BLD: 4.35 M/UL
RBC # FLD: 13.6 %
SODIUM SERPL-SCNC: 141 MMOL/L
WBC # FLD AUTO: 7 K/UL

## 2024-01-05 ENCOUNTER — APPOINTMENT (OUTPATIENT)
Dept: GASTROENTEROLOGY | Facility: CLINIC | Age: 64
End: 2024-01-05

## 2024-04-19 RX ORDER — DENOSUMAB 60 MG/ML
60 INJECTION SUBCUTANEOUS
Qty: 1 | Refills: 2 | Status: ACTIVE | COMMUNITY
Start: 2023-10-27 | End: 1900-01-01

## 2024-04-26 ENCOUNTER — NON-APPOINTMENT (OUTPATIENT)
Age: 64
End: 2024-04-26

## 2024-05-07 ENCOUNTER — NON-APPOINTMENT (OUTPATIENT)
Age: 64
End: 2024-05-07

## 2024-05-17 ENCOUNTER — APPOINTMENT (OUTPATIENT)
Dept: OBGYN | Facility: CLINIC | Age: 64
End: 2024-05-17
Payer: COMMERCIAL

## 2024-05-17 PROCEDURE — 99211 OFF/OP EST MAY X REQ PHY/QHP: CPT

## 2024-05-17 RX ORDER — DENOSUMAB 60 MG/ML
60 INJECTION SUBCUTANEOUS
Qty: 1 | Refills: 0 | Status: COMPLETED | OUTPATIENT
Start: 2024-05-17

## 2024-10-28 ENCOUNTER — APPOINTMENT (OUTPATIENT)
Dept: OBGYN | Facility: CLINIC | Age: 64
End: 2024-10-28
Payer: COMMERCIAL

## 2024-10-28 PROCEDURE — 99211 OFF/OP EST MAY X REQ PHY/QHP: CPT

## 2024-10-28 RX ADMIN — DENOSUMAB 0 MG/ML: 60 INJECTION SUBCUTANEOUS at 00:00

## 2025-01-06 DIAGNOSIS — M81.0 AGE-RELATED OSTEOPOROSIS W/OUT CURRENT PATHOLOGICAL FRACTURE: ICD-10-CM

## 2025-01-06 DIAGNOSIS — Z87.39 PERSONAL HISTORY OF OTHER DISEASES OF THE MUSCULOSKELETAL SYSTEM AND CONNECTIVE TISSUE: ICD-10-CM

## 2025-04-17 ENCOUNTER — NON-APPOINTMENT (OUTPATIENT)
Age: 65
End: 2025-04-17

## 2025-06-04 ENCOUNTER — APPOINTMENT (OUTPATIENT)
Dept: GASTROENTEROLOGY | Facility: CLINIC | Age: 65
End: 2025-06-04
Payer: COMMERCIAL

## 2025-06-04 VITALS
OXYGEN SATURATION: 99 % | HEART RATE: 87 BPM | RESPIRATION RATE: 14 BRPM | SYSTOLIC BLOOD PRESSURE: 100 MMHG | HEIGHT: 63 IN | BODY MASS INDEX: 21.97 KG/M2 | WEIGHT: 124 LBS | DIASTOLIC BLOOD PRESSURE: 60 MMHG

## 2025-06-04 DIAGNOSIS — R10.13 EPIGASTRIC PAIN: ICD-10-CM

## 2025-06-04 DIAGNOSIS — Z86.0100 PERSONAL HISTORY OF COLON POLYPS, UNSPECIFIED: ICD-10-CM

## 2025-06-04 DIAGNOSIS — Z80.0 ENCOUNTER FOR SCREENING FOR MALIGNANT NEOPLASM OF COLON: ICD-10-CM

## 2025-06-04 DIAGNOSIS — R43.8 OTHER DISTURBANCES OF SMELL AND TASTE: ICD-10-CM

## 2025-06-04 DIAGNOSIS — K21.9 GASTRO-ESOPHAGEAL REFLUX DISEASE W/OUT ESOPHAGITIS: ICD-10-CM

## 2025-06-04 DIAGNOSIS — Z12.11 ENCOUNTER FOR SCREENING FOR MALIGNANT NEOPLASM OF COLON: ICD-10-CM

## 2025-06-04 DIAGNOSIS — Z80.0 FAMILY HISTORY OF MALIGNANT NEOPLASM OF DIGESTIVE ORGANS: ICD-10-CM

## 2025-06-04 PROCEDURE — 99214 OFFICE O/P EST MOD 30 MIN: CPT

## 2025-06-04 PROCEDURE — G2211 COMPLEX E/M VISIT ADD ON: CPT | Mod: NC

## 2025-06-04 RX ORDER — SODIUM PICOSULFATE, MAGNESIUM OXIDE, AND ANHYDROUS CITRIC ACID 12; 3.5; 1 G/175ML; G/175ML; MG/175ML
10-3.5-12 MG-GM LIQUID ORAL TWICE DAILY
Qty: 2 | Refills: 0 | Status: ACTIVE | COMMUNITY
Start: 2025-06-04 | End: 1900-01-01

## 2025-06-09 RX ORDER — SODIUM SULFATE, POTASSIUM SULFATE AND MAGNESIUM SULFATE 1.6; 3.13; 17.5 G/177ML; G/177ML; G/177ML
17.5-3.13-1.6 SOLUTION ORAL
Qty: 354 | Refills: 0 | Status: ACTIVE | COMMUNITY
Start: 2025-06-09 | End: 1900-01-01

## 2025-06-23 ENCOUNTER — APPOINTMENT (OUTPATIENT)
Dept: CT IMAGING | Facility: CLINIC | Age: 65
End: 2025-06-23
Payer: COMMERCIAL

## 2025-06-23 PROCEDURE — 74160 CT ABDOMEN W/CONTRAST: CPT

## 2025-07-03 ENCOUNTER — APPOINTMENT (OUTPATIENT)
Dept: GASTROENTEROLOGY | Facility: AMBULATORY SURGERY CENTER | Age: 65
End: 2025-07-03

## 2025-07-15 ENCOUNTER — APPOINTMENT (OUTPATIENT)
Dept: ORTHOPEDIC SURGERY | Facility: CLINIC | Age: 65
End: 2025-07-15

## 2025-08-19 ENCOUNTER — NON-APPOINTMENT (OUTPATIENT)
Age: 65
End: 2025-08-19

## 2025-08-19 ENCOUNTER — APPOINTMENT (OUTPATIENT)
Dept: OBGYN | Facility: CLINIC | Age: 65
End: 2025-08-19
Payer: COMMERCIAL

## 2025-08-19 VITALS
WEIGHT: 125 LBS | HEIGHT: 63 IN | DIASTOLIC BLOOD PRESSURE: 69 MMHG | SYSTOLIC BLOOD PRESSURE: 111 MMHG | HEART RATE: 64 BPM | BODY MASS INDEX: 22.15 KG/M2

## 2025-08-19 DIAGNOSIS — Z01.419 ENCOUNTER FOR GYNECOLOGICAL EXAMINATION (GENERAL) (ROUTINE) W/OUT ABNORMAL FINDINGS: ICD-10-CM

## 2025-08-19 DIAGNOSIS — M81.0 AGE-RELATED OSTEOPOROSIS W/OUT CURRENT PATHOLOGICAL FRACTURE: ICD-10-CM

## 2025-08-19 DIAGNOSIS — N95.2 POSTMENOPAUSAL ATROPHIC VAGINITIS: ICD-10-CM

## 2025-08-19 DIAGNOSIS — Z78.9 OTHER SPECIFIED HEALTH STATUS: ICD-10-CM

## 2025-08-19 PROCEDURE — 99397 PER PM REEVAL EST PAT 65+ YR: CPT

## 2025-08-19 PROCEDURE — 96127 BRIEF EMOTIONAL/BEHAV ASSMT: CPT

## 2025-08-19 PROCEDURE — 99459 PELVIC EXAMINATION: CPT

## 2025-08-21 LAB — HPV HIGH+LOW RISK DNA PNL CVX: NOT DETECTED

## 2025-08-25 LAB — CYTOLOGY CVX/VAG DOC THIN PREP: ABNORMAL

## (undated) DEVICE — TUBING IV SET GRAVITY 3Y 100" MACRO

## (undated) DEVICE — DRSG 2X2

## (undated) DEVICE — CATH IV SAFE BC 22G X 1" (BLUE)

## (undated) DEVICE — SALIVA EJECTOR (BLUE)

## (undated) DEVICE — SOLIDIFIER ISOLYZER 2000 CC

## (undated) DEVICE — Device

## (undated) DEVICE — BASIN EMESIS 10IN GRADUATED MAUVE

## (undated) DEVICE — LUBRICATING JELLY HR ONE SHOT 3G

## (undated) DEVICE — CONTAINER FORMALIN 10% 20ML

## (undated) DEVICE — BIOPSY FORCEP RADIAL JAW 4 STANDARD WITH NEEDLE

## (undated) DEVICE — DRSG BANDAID 0.75X3"

## (undated) DEVICE — ELCTR ECG CONDUCTIVE ADHESIVE

## (undated) DEVICE — TUBING SUCTION NONCONDUCTIVE 6MM X 12FT

## (undated) DEVICE — ELCTR GROUNDING PAD ADULT COVIDIEN

## (undated) DEVICE — KIT ENDO PROCEDURE CUST W/VLV

## (undated) DEVICE — PACK IV START WITH CHG

## (undated) DEVICE — BIOPSY FORCEP COLD DISP

## (undated) DEVICE — DRSG CURITY GAUZE SPONGE 4 X 4" 12-PLY NON-STERILE

## (undated) DEVICE — TUBING MEDI-VAC W MAXIGRIP CONNECTORS 1/4"X6'

## (undated) DEVICE — SNARE CAPTIVATOR RND COLD STIFF 2.4X10MM 240CM

## (undated) DEVICE — LINE BREATHE SAMPLNG

## (undated) DEVICE — GOWN LG